# Patient Record
Sex: MALE | Employment: FULL TIME | URBAN - METROPOLITAN AREA
[De-identification: names, ages, dates, MRNs, and addresses within clinical notes are randomized per-mention and may not be internally consistent; named-entity substitution may affect disease eponyms.]

---

## 2021-12-18 ENCOUNTER — HOSPITAL ENCOUNTER (EMERGENCY)
Facility: CLINIC | Age: 26
Discharge: HOME | End: 2021-12-19
Attending: EMERGENCY MEDICINE | Admitting: EMERGENCY MEDICINE

## 2021-12-18 ENCOUNTER — NURSE TRIAGE (OUTPATIENT)
Dept: NURSING | Facility: CLINIC | Age: 26
End: 2021-12-18

## 2021-12-18 VITALS
HEART RATE: 71 BPM | OXYGEN SATURATION: 98 % | DIASTOLIC BLOOD PRESSURE: 73 MMHG | SYSTOLIC BLOOD PRESSURE: 140 MMHG | RESPIRATION RATE: 20 BRPM | TEMPERATURE: 98.3 F

## 2021-12-18 DIAGNOSIS — T15.91XA FOREIGN BODY, EYE, RIGHT, INITIAL ENCOUNTER: ICD-10-CM

## 2021-12-18 DIAGNOSIS — R11.2 NON-INTRACTABLE VOMITING WITH NAUSEA, UNSPECIFIED VOMITING TYPE: ICD-10-CM

## 2021-12-18 DIAGNOSIS — S05.01XA ABRASION OF RIGHT CORNEA, INITIAL ENCOUNTER: ICD-10-CM

## 2021-12-18 LAB
BASOPHILS # BLD AUTO: 0 10E3/UL (ref 0–0.2)
BASOPHILS NFR BLD AUTO: 0 %
EOSINOPHIL # BLD AUTO: 0 10E3/UL (ref 0–0.7)
EOSINOPHIL NFR BLD AUTO: 0 %
ERYTHROCYTE [DISTWIDTH] IN BLOOD BY AUTOMATED COUNT: 12.5 % (ref 10–15)
HCT VFR BLD AUTO: 47.8 % (ref 40–53)
HGB BLD-MCNC: 16.3 G/DL (ref 13.3–17.7)
IMM GRANULOCYTES # BLD: 0 10E3/UL
IMM GRANULOCYTES NFR BLD: 0 %
LYMPHOCYTES # BLD AUTO: 1.7 10E3/UL (ref 0.8–5.3)
LYMPHOCYTES NFR BLD AUTO: 13 %
MCH RBC QN AUTO: 30.9 PG (ref 26.5–33)
MCHC RBC AUTO-ENTMCNC: 34.1 G/DL (ref 31.5–36.5)
MCV RBC AUTO: 91 FL (ref 78–100)
MONOCYTES # BLD AUTO: 0.4 10E3/UL (ref 0–1.3)
MONOCYTES NFR BLD AUTO: 3 %
NEUTROPHILS # BLD AUTO: 10.9 10E3/UL (ref 1.6–8.3)
NEUTROPHILS NFR BLD AUTO: 84 %
NRBC # BLD AUTO: 0 10E3/UL
NRBC BLD AUTO-RTO: 0 /100
PLATELET # BLD AUTO: 374 10E3/UL (ref 150–450)
RBC # BLD AUTO: 5.27 10E6/UL (ref 4.4–5.9)
WBC # BLD AUTO: 12.9 10E3/UL (ref 4–11)

## 2021-12-18 PROCEDURE — 250N000011 HC RX IP 250 OP 636: Performed by: EMERGENCY MEDICINE

## 2021-12-18 PROCEDURE — 96374 THER/PROPH/DIAG INJ IV PUSH: CPT

## 2021-12-18 PROCEDURE — 82375 ASSAY CARBOXYHB QUANT: CPT | Performed by: EMERGENCY MEDICINE

## 2021-12-18 PROCEDURE — 85025 COMPLETE CBC W/AUTO DIFF WBC: CPT | Performed by: EMERGENCY MEDICINE

## 2021-12-18 PROCEDURE — 36415 COLL VENOUS BLD VENIPUNCTURE: CPT | Performed by: EMERGENCY MEDICINE

## 2021-12-18 PROCEDURE — 65222 REMOVE FOREIGN BODY FROM EYE: CPT | Mod: RT

## 2021-12-18 PROCEDURE — 80048 BASIC METABOLIC PNL TOTAL CA: CPT | Performed by: EMERGENCY MEDICINE

## 2021-12-18 PROCEDURE — 258N000003 HC RX IP 258 OP 636: Performed by: EMERGENCY MEDICINE

## 2021-12-18 PROCEDURE — 96361 HYDRATE IV INFUSION ADD-ON: CPT

## 2021-12-18 PROCEDURE — 99284 EMERGENCY DEPT VISIT MOD MDM: CPT | Mod: 25

## 2021-12-18 RX ORDER — TETRACAINE HYDROCHLORIDE 5 MG/ML
SOLUTION OPHTHALMIC
Status: COMPLETED
Start: 2021-12-18 | End: 2021-12-19

## 2021-12-18 RX ORDER — ONDANSETRON 2 MG/ML
4 INJECTION INTRAMUSCULAR; INTRAVENOUS
Status: DISCONTINUED | OUTPATIENT
Start: 2021-12-18 | End: 2021-12-19 | Stop reason: HOSPADM

## 2021-12-18 RX ADMIN — ONDANSETRON 4 MG: 2 INJECTION INTRAMUSCULAR; INTRAVENOUS at 23:50

## 2021-12-18 RX ADMIN — SODIUM CHLORIDE 1000 ML: 9 INJECTION, SOLUTION INTRAVENOUS at 23:52

## 2021-12-18 ASSESSMENT — ENCOUNTER SYMPTOMS
VOMITING: 1
NAUSEA: 1
LIGHT-HEADEDNESS: 0
EYE PAIN: 1
DIZZINESS: 0
HEADACHES: 0

## 2021-12-19 LAB
ANION GAP SERPL CALCULATED.3IONS-SCNC: 5 MMOL/L (ref 3–14)
BUN SERPL-MCNC: 22 MG/DL (ref 7–30)
CALCIUM SERPL-MCNC: 9.6 MG/DL (ref 8.5–10.1)
CHLORIDE BLD-SCNC: 109 MMOL/L (ref 94–109)
CO2 SERPL-SCNC: 25 MMOL/L (ref 20–32)
COHGB MFR BLD: 0.9 % (ref 0–2)
CREAT SERPL-MCNC: 0.72 MG/DL (ref 0.66–1.25)
GFR SERPL CREATININE-BSD FRML MDRD: >90 ML/MIN/1.73M2
GLUCOSE BLD-MCNC: 143 MG/DL (ref 70–99)
HOLD SPECIMEN: NORMAL
HOLD SPECIMEN: NORMAL
POTASSIUM BLD-SCNC: 4.4 MMOL/L (ref 3.4–5.3)
SODIUM SERPL-SCNC: 139 MMOL/L (ref 133–144)

## 2021-12-19 PROCEDURE — 250N000009 HC RX 250

## 2021-12-19 RX ORDER — HYDROCODONE BITARTRATE AND ACETAMINOPHEN 5; 325 MG/1; MG/1
1 TABLET ORAL EVERY 6 HOURS PRN
Qty: 10 TABLET | Refills: 0 | Status: SHIPPED | OUTPATIENT
Start: 2021-12-19

## 2021-12-19 RX ORDER — ERYTHROMYCIN 5 MG/G
0.5 OINTMENT OPHTHALMIC AT BEDTIME
Qty: 4 G | Refills: 0 | Status: SHIPPED | OUTPATIENT
Start: 2021-12-19

## 2021-12-19 RX ORDER — ONDANSETRON 4 MG/1
4 TABLET, ORALLY DISINTEGRATING ORAL EVERY 8 HOURS PRN
Qty: 10 TABLET | Refills: 0 | Status: SHIPPED | OUTPATIENT
Start: 2021-12-19

## 2021-12-19 RX ADMIN — FLUORESCEIN SODIUM: 1 STRIP OPHTHALMIC at 01:23

## 2021-12-19 RX ADMIN — TETRACAINE HYDROCHLORIDE: 5 SOLUTION OPHTHALMIC at 01:24

## 2021-12-19 NOTE — ED PROVIDER NOTES
History   Chief Complaint:  Foreign Body in Eye     The history is provided by the patient. A  was used (Arabic).      Calvin Condon is a 26 year old male who presents with foreign body in eye. About twelve hours while at work there was a fan and it blew something into his right eye. He could see something in his eye but was not able to remove it which is why he is here. 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 He is having a hard time seeing from that eye. He did wash it with water earlier. While at work he developed a headache, dizziness, and nausea and vomiting. He thinks he may have been exposed to gas. Here in the ED his headache and dizziness has resolved but he is still feeling nauseous.     Review of Systems   Eyes: Positive for pain and visual disturbance.   Gastrointestinal: Positive for nausea and vomiting.   Neurological: Negative for dizziness, light-headedness and headaches.   All other systems reviewed and are negative.     Allergies:  The patient has no known allergies.     Medications:  The patient denies use of medications.     Past Medical History:     The patient denies past medical history.     Social History:  Presents to ED alone   Speaks Arabic      Physical Exam     Patient Vitals for the past 24 hrs:   BP Temp Temp src Pulse Resp SpO2   12/18/21 2042 (!) 140/73 98.3  F (36.8  C) Oral 71 20 98 %       Physical Exam  Constitutional: Alert, attentive  HENT:    Nose: Nose normal.    Mouth/Throat: Oropharynx is clear, mucous membranes are moist   Eyes: EOM are normal.   Slit Lamp Exam:  - No scleral/conjunctival injection  - EOMI  - +metallic foreign body to the right eye at 8 o'clock over the outermost aspect of the iris; no other FB with eversion of the lids  - +fluorescein uptake at the site of removed FB consistent with abrasion/ulceration; no William sign  - No Cell or flare  - No hypopion or hyphema  CV: regular rate and rhythm; no murmurs, rubs  or gallups  Chest: Effort normal and breath sounds normal.   GI:  There is no tenderness. No distension. Normal bowel sounds  MSK: Normal range of motion.   Neurological: Alert, attentive   CN 2-12 intact   Normal gait  Skin: Skin is warm and dry.      Emergency Department Course     Emergency Department Course:    Reviewed:  I reviewed nursing notes, vitals and past medical history    Assessments:  2252 I obtained history and examined the patient as noted above.   2324 I rechecked the patient and removed foreign body using noam brush.   0014 I rechecked the patient and explained findings.     Interventions:  2350 Zofran, 4 mg, IV   2352 NS, 1L, IV     Disposition:  The patient was discharged to home.     Impression & Plan     Medical Decision Making:  This is a 26-year-old male presents for evaluation of 2 concerns:  1.  Right eye pain and foreign body: Patient has an evident area of metallic foreign body to the right eye cornea.  This is removed easily with an [place] brush, which reveals no underlying corneal abrasion/ulceration.  No William sign to suggest globe injury and pain is significant improved with removal of the foreign body.  Patient is subjectively normalized after this as well.  Plan erythromycin ointment and ophthalmology follow-up in 1 to 2 days if symptoms not improving.  Return precautions for worse pain, vision changes, or any other concerns.  Short course of Norco for pain.  2.  Headache, nausea, vomiting, dizziness: The patient endorses the symptoms beginning after onset of eye foreign body.  He notes that his work colleagues indicated that may have been exposed to some sort of gas at work but he is unclear.  All symptoms aside from nausea are resolved many hours ago.  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  Symptoms are improved with Zofran here.  All the above could have been attributable to his  eye pain/injury as well.  Plan Zofran at home, primary care follow-up in 2 to 3 days for recheck, return precautions for intractable vomiting, abdominal pain, or any other concerns.    Diagnosis:    ICD-10-CM    1. Foreign body, eye, right, initial encounter  T15.91XA    2. Abrasion of right cornea, initial encounter  S05.01XA    3. Non-intractable vomiting with nausea, unspecified vomiting type  R11.2        Discharge Medications:  New Prescriptions    ERYTHROMYCIN (ROMYCIN) 5 MG/GM OPHTHALMIC OINTMENT    Place 0.5 inches into the right eye At Bedtime    HYDROCODONE-ACETAMINOPHEN (NORCO) 5-325 MG TABLET    Take 1 tablet by mouth every 6 hours as needed for pain    ONDANSETRON (ZOFRAN-ODT) 4 MG ODT TAB    Take 1 tablet (4 mg) by mouth every 8 hours as needed for nausea       Scribe Disclosure:  Francisco Javier BARRY, am serving as a scribe at 10:39 PM on 12/18/2021 to document services personally performed by Tobi Izquierdo MD based on my observations and the provider's statements to me.            Tobi Izquierdo MD  12/19/21 0115

## 2021-12-19 NOTE — TELEPHONE ENCOUNTER
Patient and wife calling to report there was a lot of gas smell at work and patient started feeling dizzy with vomiting.  Patient gave permission to speak to wife.  Also unable to open right eye and his wife looked and there is a piece of what looks like metal on the eyeball.    Disposition is to go to ER.  Verbalized understanding and agrees with plan.  Patient will find ride to the ER.    Arabella Sanchez RN  [facility]    COVID 19 Nurse Triage Plan/Patient Instructions    Please be aware that novel coronavirus (COVID-19) may be circulating in the community. If you develop symptoms such as fever, cough, or SOB or if you have concerns about the presence of another infection including coronavirus (COVID-19), please contact your health care provider or visit [email or web address].     Disposition/Instructions    ED Visit recommended. Follow protocol based instructions.     Bring Your Own Device:  Please also bring your smart device(s) (smart phones, tablets, laptops) and their charging cables for your personal use and to communicate with your care team during your visit.    Thank you for taking steps to prevent the spread of this virus.  o Limit your contact with others.  o Wear a simple mask to cover your cough.  o Wash your hands well and often.    Resources    [facility]: About COVID-19: [email or web address]    CDC: What to Do If You're Sick: www.cdc.gov/coronavirus/2019-ncov/about/steps-when-sick.html    CDC: Ending Home Isolation: www.cdc.gov/coronavirus/2019-ncov/hcp/disposition-in-home-patients.html     CDC: Caring for Someone: www.cdc.gov/coronavirus/2019-ncov/if-you-are-sick/care-for-someone.html     [facility]: Interim Guidance for Hospital Discharge to Home: www.health.[facility].mn.us/diseases/coronavirus/hcp/hospdischarge.pdf    [facility] clinical trials (COVID-19 research studies): [email or web address].[email or web address]/umn-clinical-trials     Below are the COVID-19 hotlines at  the [facility] ([facility]). Interpreters are available.   o For health questions: Call 072-387-0498 or 1-893.692.6502 (7 a.m. to 7 p.m.)  o For questions about schools and childcare: Call 021-785-4340 or 1-587.660.6148 (7 a.m. to 7 p.m.)     Reason for Disposition    Foreign body (FB) stuck on eyeball (Exception: contact lens)    Additional Information    Negative: [1] Breathing stopped AND [2] hasn't returned    Negative: Severe difficulty breathing (e.g., struggling for each breath, speaks in single words)    Negative: Severe weakness (i.e., unable to walk or barely able to walk, requires support)    Negative: Difficult to awaken or acting confused (e.g., disoriented, slurred speech)    Negative: Bluish (or gray) lips or face now    Negative: Chest pain, tightness, or heaviness (present now)    Negative: Seizure    Negative: Coughing up dark sputum (e.g., soot)    Negative: Stridor or hoarseness (change in voice)    Negative: Facial burns, mouth burns, or singed nasal hairs    Negative: Patient attempted suicide (e.g., sitting in garage with car running)    Negative: [1] Bioterrorist event, known or suspected AND [2] exposure to biological, chemical or radioactive substance    Negative: Sounds like a life-threatening emergency to the triager    Protocols used: EYE - FOREIGN BODY-A-AH, SMOKE AND FUME INHALATION-A-AH

## 2021-12-19 NOTE — ED TRIAGE NOTES
Pt states foreign body in right eye. Visible foreign body in right lower quadrant of eye. Pt denies vision change. Pt also notes lightheadedness and vomiting after being exposed to chemicals at work. ABCs intact GCS 15